# Patient Record
(demographics unavailable — no encounter records)

---

## 2025-03-26 NOTE — BIRTH HISTORY
[At Term] : at term [ Section] : by  section [None] : there were no delivery complications [Age Appropriate] : age appropriate developmental milestones met [de-identified] : FTP

## 2025-03-26 NOTE — HISTORY OF PRESENT ILLNESS
[FreeTextEntry1] : 3 months history once a week random episode of dizziness/lightheadedness with a headaches, vision goes fuzzy, 10-15 minutes, helped with sitting down + palpitations (normal cardiac exam) No clear triggers Found to be anemic   Now eating breakfast  Episodes have decreased over the past month since she started taking Fe  Mom with migraine headaches  Started noticing neck stretching tic-like behaviors  Dad has a nervous habit of picking on the skin at the side of her thumbs, cracking her